# Patient Record
Sex: FEMALE | Race: WHITE | NOT HISPANIC OR LATINO | Employment: FULL TIME | URBAN - METROPOLITAN AREA
[De-identification: names, ages, dates, MRNs, and addresses within clinical notes are randomized per-mention and may not be internally consistent; named-entity substitution may affect disease eponyms.]

---

## 2017-12-23 ENCOUNTER — OFFICE VISIT (OUTPATIENT)
Dept: URGENT CARE | Facility: CLINIC | Age: 32
End: 2017-12-23
Payer: COMMERCIAL

## 2017-12-23 PROCEDURE — G0382 LEV 3 HOSP TYPE B ED VISIT: HCPCS

## 2017-12-24 NOTE — PROGRESS NOTES
Assessment   1  Acute URI (465 9) (J06 9)    Discussion/Summary   Discussion Summary:    Copious amounts of fluids, 6 or 8 glasses of water or juice a day  Motrin or Aleve for fever chills or aches  Vaporizer bedroom  Afrin for facial congestion  Robitussin DM for cough  Steam from the sink can be helpful to relieve congestion  Understands and agrees with treatment plan: The treatment plan was reviewed with the patient/guardian  The patient/guardian understands and agrees with the treatment plan    Counseling Documentation With Imm: The patient was counseled regarding instructions for management  Chief Complaint   1  Fatigue  Chief Complaint Free Text Note Form: Cold for a few days  Sore throat, congestion (teaches )  Today was weak, achy, fatigued, slept whole day  up for 5 minutes and had to lay back down  Denies fever but states chills and sweaty at times  Clear/slight yellow nasal drainage and phlegm  History of Present Illness   HPI: See CC    Hospital Based Practices Required Assessment:       Prefered Language is  english  Primary Language is  english  Review of Systems   Focused-Female:      ENT: no ear ache, no loss of hearing, no nosebleeds or nasal discharge, no sore throat or hoarseness  Respiratory: no complaints of shortness of breath, no wheezing, no dyspnea on exertion, no orthopnea or PND  Past Medical History   1  History of hypothyroidism (V12 29) (Z86 39)   2  History of infertility (V13 29) (Z87 42)    Social History    · Never a smoker   · No illicit drug use   · Occasional alcohol use    Current Meds    1  Synthroid 75 MCG Oral Tablet; Therapy: (Recorded:23Dec2017) to Recorded    Allergies   1   No Known Drug Allergies    Vitals   Signs   Recorded: 23Dec2017 06:17PM   Temperature: 98 4 F  Heart Rate: 81  Respiration: 16  Systolic: 340  Diastolic: 72  Height: 5 ft 2 in  Weight: 140 lb   BMI Calculated: 25 61  BSA Calculated: 1 64  O2 Saturation: 100    Physical Exam        Ears, Nose, Mouth, and Throat      External inspection of ears and nose: Normal        Otoscopic examination: Tympanic membranes translucent with normal light reflex  Canals patent without erythema  Nasal mucosa, septum, and turbinates: Normal without edema or erythema  Oropharynx: Normal with no erythema, edema, exudate or lesions  Pulmonary      Auscultation of lungs: Clear to auscultation  Cardiovascular      Auscultation of heart: Normal rate and rhythm, normal S1 and S2, without murmurs         Signatures    Electronically signed by : RUBI Barnard ; Dec 23 2017  6:40PM EST                       (Author)

## 2018-01-23 VITALS
OXYGEN SATURATION: 100 % | BODY MASS INDEX: 25.76 KG/M2 | HEART RATE: 81 BPM | SYSTOLIC BLOOD PRESSURE: 120 MMHG | TEMPERATURE: 98.4 F | RESPIRATION RATE: 16 BRPM | WEIGHT: 140 LBS | HEIGHT: 62 IN | DIASTOLIC BLOOD PRESSURE: 72 MMHG

## 2021-01-14 ENCOUNTER — IMMUNIZATIONS (OUTPATIENT)
Dept: FAMILY MEDICINE CLINIC | Facility: HOSPITAL | Age: 36
End: 2021-01-14

## 2021-01-14 DIAGNOSIS — Z23 ENCOUNTER FOR IMMUNIZATION: Primary | ICD-10-CM

## 2021-01-14 PROCEDURE — 91301 SARS-COV-2 / COVID-19 MRNA VACCINE (MODERNA) 100 MCG: CPT

## 2021-01-14 PROCEDURE — 0011A SARS-COV-2 / COVID-19 MRNA VACCINE (MODERNA) 100 MCG: CPT

## 2021-02-13 ENCOUNTER — IMMUNIZATIONS (OUTPATIENT)
Dept: FAMILY MEDICINE CLINIC | Facility: HOSPITAL | Age: 36
End: 2021-02-13

## 2021-02-13 DIAGNOSIS — Z23 ENCOUNTER FOR IMMUNIZATION: Primary | ICD-10-CM

## 2021-02-13 PROCEDURE — 91301 SARS-COV-2 / COVID-19 MRNA VACCINE (MODERNA) 100 MCG: CPT

## 2021-02-13 PROCEDURE — 0012A SARS-COV-2 / COVID-19 MRNA VACCINE (MODERNA) 100 MCG: CPT

## 2022-12-05 ENCOUNTER — OFFICE VISIT (OUTPATIENT)
Dept: URGENT CARE | Facility: CLINIC | Age: 37
End: 2022-12-05

## 2022-12-05 VITALS
TEMPERATURE: 100 F | HEART RATE: 85 BPM | WEIGHT: 167 LBS | SYSTOLIC BLOOD PRESSURE: 134 MMHG | RESPIRATION RATE: 16 BRPM | OXYGEN SATURATION: 99 % | BODY MASS INDEX: 30.54 KG/M2 | DIASTOLIC BLOOD PRESSURE: 78 MMHG

## 2022-12-05 DIAGNOSIS — H61.22 IMPACTED CERUMEN OF LEFT EAR: ICD-10-CM

## 2022-12-05 DIAGNOSIS — H66.92 LEFT OTITIS MEDIA, UNSPECIFIED OTITIS MEDIA TYPE: Primary | ICD-10-CM

## 2022-12-05 RX ORDER — AMOXICILLIN 875 MG/1
875 TABLET, COATED ORAL 2 TIMES DAILY
Qty: 20 TABLET | Refills: 0 | Status: SHIPPED | OUTPATIENT
Start: 2022-12-05 | End: 2022-12-15

## 2022-12-05 NOTE — PATIENT INSTRUCTIONS
Patient was educated on left ear infection  Patient was prescribed antibiotics  Patient was told to eat on antibiotics  If symptoms persist follow up with PCP    Ear Infection   WHAT YOU NEED TO KNOW:   An ear infection is also called otitis media  Blocked or swollen eustachian tubes can cause an infection  Eustachian tubes connect the middle ear to the back of the nose and throat  They drain fluid from the middle ear  You may have a buildup of fluid in your ear  Germs build up in the fluid and infection develops  DISCHARGE INSTRUCTIONS:   Return to the emergency department if:   You have clear fluid coming from your ear  You have a stiff neck, headache, and a fever  Call your doctor if:   You see blood or pus draining from your ear  Your ear pain gets worse or does not go away, even after treatment  The outside of your ear is red or swollen  You are vomiting or have diarrhea  You have questions or concerns about your condition or care  Medicines: You may  need any of the following:  Acetaminophen  decreases pain and fever  It is available without a doctor's order  Ask how much to take and how often to take it  Follow directions  Read the labels of all other medicines you are using to see if they also contain acetaminophen, or ask your doctor or pharmacist  Acetaminophen can cause liver damage if not taken correctly  Do not use more than 4 grams (4,000 milligrams) total of acetaminophen in one day  NSAIDs , such as ibuprofen, help decrease swelling, pain, and fever  This medicine is available with or without a doctor's order  NSAIDs can cause stomach bleeding or kidney problems in certain people  If you take blood thinner medicine, always ask your healthcare provider if NSAIDs are safe for you  Always read the medicine label and follow directions  Ear drops  may contain medicine to decrease pain and inflammation  Antibiotics  help treat a bacterial infection      Take your medicine as directed  Contact your healthcare provider if you think your medicine is not helping or if you have side effects  Tell him or her if you are allergic to any medicine  Keep a list of the medicines, vitamins, and herbs you take  Include the amounts, and when and why you take them  Bring the list or the pill bottles to follow-up visits  Carry your medicine list with you in case of an emergency  Self-care:   Apply heat  on your ear for 15 to 20 minutes, 3 to 4 times a day or as directed  You can apply heat with an electric heating pad, hot water bottle, or warm compress  Always put a cloth between your skin and the heat pack to prevent burns  Heat helps decrease pain  Apply ice  on your ear for 15 to 20 minutes, 3 to 4 times a day for 2 days or as directed  Use an ice pack, or put crushed ice in a plastic bag  Cover it with a towel before you apply it to your ear  Ice decreases swelling and pain  Prevent an ear infection:   Wash your hands often  to help prevent the spread of germs  Ask everyone in your house to wash their hands with soap and water  Ask them to wash after they use the bathroom or change a diaper  Remind them to wash before they prepare or eat food  Stay away from people who are ill  Some germs spread easily and quickly through contact  Follow up with your doctor as directed:  Write down your questions so you remember to ask them during your visits  © Copyright [employer] 2022 Information is for End User's use only and may not be sold, redistributed or otherwise used for commercial purposes  All illustrations and images included in CareNotes® are the copyrighted property of [employer]  or Beverley Garzon  The above information is an  only  It is not intended as medical advice for individual conditions or treatments  Talk to your doctor, nurse or pharmacist before following any medical regimen to see if it is safe and effective for you

## 2022-12-05 NOTE — PROGRESS NOTES
[facility]        NAME: Shannon Boyd is a 30 y o  female  : 1985    MRN: [id number]  DATE: 2022  TIME: 6:20 PM    Assessment and Plan   Left otitis media, unspecified otitis media type [H66 92]  1  Left otitis media, unspecified otitis media type  amoxicillin (AMOXIL) 875 mg tablet      2  Impacted cerumen of left ear            Ear cerumen removal    Date/Time: 2022 6:16 PM  Performed by: Kyung Mims PA-C  Authorized by: Kyung Mims PA-C   Universal Protocol:  Consent: Verbal consent obtained  Risks and benefits: risks, benefits and alternatives were discussed  Consent given by: patient  Patient identity confirmed: verbally with patient      Patient location:  Clinic  Procedure details:     Local anesthetic:  None    Location:  L ear    Procedure type: irrigation only    Post-procedure details:     Complication:  None    Hearing quality:  Normal    Patient tolerance of procedure: Tolerated well, no immediate complications      Patient Instructions     Patient was educated on left ear infection  Patient was prescribed antibiotics  Patient was told to eat on antibiotics  If symptoms persist follow up with PCP  Chief Complaint     Chief Complaint   Patient presents with   • Earache     Patient present with left ear pain that began last night         History of Present Illness       Patient is here today complaining of left ear pain for 1 day  Denies any allergies to medications  Denies any history of asthma or diabetes      Review of Systems   Review of Systems   Constitutional: Negative  HENT: Positive for ear pain  Respiratory: Negative  Cardiovascular: Negative  Psychiatric/Behavioral: Negative            Current Medications       Current Outpatient Medications:   •  amoxicillin (AMOXIL) 875 mg tablet, Take 1 tablet (875 mg total) by mouth 2 (two) times a day for 10 days, Disp: 20 tablet, Rfl: 0    Current Allergies Allergies as of 12/05/2022   • (No Known Allergies)            The following portions of the patient's history were reviewed and updated as appropriate: allergies, current medications, past family history, past medical history, past social history, past surgical history and problem list      History reviewed  No pertinent past medical history  History reviewed  No pertinent surgical history  History reviewed  No pertinent family history  Medications have been verified  Objective   /78   Pulse 85   Temp 100 °F (37 8 °C) (Tympanic)   Resp 16   Wt 75 8 kg (167 lb)   LMP 11/27/2022   SpO2 99%   BMI 30 54 kg/m²   Patient's last menstrual period was 11/27/2022  Physical Exam     Physical Exam  Vitals and nursing note reviewed  Constitutional:       Appearance: Normal appearance  HENT:      Head: Normocephalic  Right Ear: Tympanic membrane, ear canal and external ear normal       Left Ear: There is impacted cerumen  Ears:      Comments: LEFT TM once cerumen was removed was red and bulging     Nose: Nose normal       Mouth/Throat:      Mouth: Mucous membranes are moist    Eyes:      Extraocular Movements: Extraocular movements intact  Pupils: Pupils are equal, round, and reactive to light  Cardiovascular:      Rate and Rhythm: Normal rate and regular rhythm  Heart sounds: Normal heart sounds  Pulmonary:      Breath sounds: Normal breath sounds  No wheezing  Neurological:      General: No focal deficit present  Mental Status: She is alert and oriented to person, place, and time     Psychiatric:         Mood and Affect: Mood normal          Behavior: Behavior normal